# Patient Record
Sex: FEMALE | URBAN - METROPOLITAN AREA
[De-identification: names, ages, dates, MRNs, and addresses within clinical notes are randomized per-mention and may not be internally consistent; named-entity substitution may affect disease eponyms.]

---

## 2022-08-24 ENCOUNTER — APPOINTMENT (OUTPATIENT)
Dept: URBAN - METROPOLITAN AREA SURGERY 12 | Age: 42
Setting detail: DERMATOLOGY
End: 2022-08-24

## 2022-08-24 DIAGNOSIS — Z41.9 ENCOUNTER FOR PROCEDURE FOR PURPOSES OTHER THAN REMEDYING HEALTH STATE, UNSPECIFIED: ICD-10-CM

## 2022-08-24 PROCEDURE — OTHER LASER HAIR REMOVAL: OTHER

## 2022-08-24 NOTE — PROCEDURE: LASER HAIR REMOVAL
External Cooling Fan Speed: 0
Post-Procedure Care: Immediate endpoint: perifollicular erythema and edema. Sunscreen applied. Post care reviewed with patient.
Fluence (Will Not Render If 0): 22
Fluence (Will Not Render If 0): 20
Detail Level: Zone
Cooling: DCD 40/30
Render Post-Care In The Note: Yes
Tolerated Procedure (Optional): Tolerated Well
Laser Type: Alexandrite 755nm
Pulse Duration (Include Units): 3
Eye Shield Text: Given the treatment area eye shields were inserted prior to treatment.
Cooling: DCD setting
Total Pulses: 59
Spot Size: 15 mm
Shaving (Optional): The patient shaved at home
Consent: Verbal consent obtained, risks reviewed including but not limited to crusting, scabbing, blistering, scarring, darker or lighter pigmentary change, paradoxical hair regrowth, incomplete removal of hair and infection.
Treatment Number: 1
Total Pulses: 68
Pre-Procedure: Prior to proceeding the treatment areas were cleaned and all present put on their eye protection.
Were Eye Shields Employed?: No
Post-Care Instructions: I reviewed with the patient in detail post-care instructions. Patient should avoid sun for a minimum of 2 weeks before and after treatment.
Fluence (Will Not Render If 0): 12
Price (Use Numbers Only, No Special Characters Or $): 75.00

## 2023-01-03 ENCOUNTER — APPOINTMENT (OUTPATIENT)
Dept: URBAN - METROPOLITAN AREA SURGERY 12 | Age: 43
Setting detail: DERMATOLOGY
End: 2023-01-03

## 2023-01-03 DIAGNOSIS — Z41.9 ENCOUNTER FOR PROCEDURE FOR PURPOSES OTHER THAN REMEDYING HEALTH STATE, UNSPECIFIED: ICD-10-CM

## 2023-01-03 PROCEDURE — OTHER LASER HAIR REMOVAL: OTHER

## 2023-01-03 NOTE — PROCEDURE: LASER HAIR REMOVAL
Consent: Verbal consent obtained, risks reviewed including but not limited to crusting, scabbing, blistering, scarring, darker or lighter pigmentary change, paradoxical hair regrowth, incomplete removal of hair and infection.
Pulse Duration (Include Units): 3
Shaving (Optional): The patient shaved at home
Laser Type: Alexandrite 755nm
Number Of Prepaid Treatments (Will Not Render If 0): 0
Eye Shield Text: Given the treatment area eye shields were inserted prior to treatment.
Cooling: DCD setting
Spot Size: 15 mm
Total Pulses: 124
Post-Procedure Care: Immediate endpoint: perifollicular erythema and edema. Sunscreen applied. Post care reviewed with patient.
Spot Size: 18 mm
Total Pulses: 1434
Cooling: DCD 40/30
Fluence (Will Not Render If 0): 20
Render Post-Care In The Note: Yes
Pre-Procedure: Prior to proceeding the treatment areas were cleaned and all present put on their eye protection.
Treatment Number: 2
Price (Use Numbers Only, No Special Characters Or $): 75.00
Fluence (Will Not Render If 0): 14
Post-Care Instructions: I reviewed with the patient in detail post-care instructions. Patient should avoid sun for a minimum of 2 weeks before and after treatment.
Were Eye Shields Employed?: No
Detail Level: Zone
Tolerated Procedure (Optional): Tolerated Well

## 2023-11-15 ENCOUNTER — APPOINTMENT (OUTPATIENT)
Dept: URBAN - METROPOLITAN AREA SURGERY 12 | Age: 43
Setting detail: DERMATOLOGY
End: 2023-11-15

## 2023-11-15 DIAGNOSIS — Z41.9 ENCOUNTER FOR PROCEDURE FOR PURPOSES OTHER THAN REMEDYING HEALTH STATE, UNSPECIFIED: ICD-10-CM

## 2023-11-15 PROCEDURE — OTHER LASER HAIR REMOVAL: OTHER

## 2023-11-15 NOTE — PROCEDURE: LASER HAIR REMOVAL
Consent: Verbal consent obtained, risks reviewed including but not limited to crusting, scabbing, blistering, scarring, darker or lighter pigmentary change, paradoxical hair regrowth, incomplete removal of hair and infection.
Pulse Duration (Include Units): 3
Shaving (Optional): The patient shaved at home
Laser Type: Alexandrite 755nm
Number Of Prepaid Treatments (Will Not Render If 0): 0
Eye Shield Text: Given the treatment area eye shields were inserted prior to treatment.
Cooling: DCD setting
Spot Size: 18 mm
Total Pulses: 97
Post-Procedure Care: Immediate endpoint: perifollicular erythema and edema. Sunscreen applied. Post care reviewed with patient.
Total Pulses: 1434
Cooling: DCD 40/30
Fluence (Will Not Render If 0): 20
Render Post-Care In The Note: Yes
Pre-Procedure: Prior to proceeding the treatment areas were cleaned and all present put on their eye protection.
Price (Use Numbers Only, No Special Characters Or $): 75.00
Fluence (Will Not Render If 0): 10
Post-Care Instructions: I reviewed with the patient in detail post-care instructions. Patient should avoid sun for a minimum of 2 weeks before and after treatment.
Fluence (Will Not Render If 0): 14
Were Eye Shields Employed?: No
Detail Level: Zone
Tolerated Procedure (Optional): Tolerated Well

## 2024-01-24 ENCOUNTER — APPOINTMENT (OUTPATIENT)
Dept: URBAN - METROPOLITAN AREA SURGERY 12 | Age: 44
Setting detail: DERMATOLOGY
End: 2024-01-24

## 2024-01-24 DIAGNOSIS — Z41.9 ENCOUNTER FOR PROCEDURE FOR PURPOSES OTHER THAN REMEDYING HEALTH STATE, UNSPECIFIED: ICD-10-CM

## 2024-01-24 PROCEDURE — OTHER LASER HAIR REMOVAL: OTHER

## 2024-01-24 NOTE — PROCEDURE: LASER HAIR REMOVAL
Consent: Verbal consent obtained, risks reviewed including but not limited to crusting, scabbing, blistering, scarring, darker or lighter pigmentary change, paradoxical hair regrowth, incomplete removal of hair and infection.
Pulse Duration (Include Units): 3
Shaving (Optional): The patient shaved at home
Laser Type: Alexandrite 755nm
Number Of Prepaid Treatments (Will Not Render If 0): 0
Eye Shield Text: Given the treatment area eye shields were inserted prior to treatment.
Cooling: DCD setting
Spot Size: 18 mm
Total Pulses: 74
Post-Procedure Care: Immediate endpoint: perifollicular erythema and edema. Sunscreen applied. Post care reviewed with patient.
Total Pulses: 1434
Cooling: DCD 40/30
Fluence (Will Not Render If 0): 20
Render Post-Care In The Note: Yes
Pre-Procedure: Prior to proceeding the treatment areas were cleaned and all present put on their eye protection.
Treatment Number: 4
Price (Use Numbers Only, No Special Characters Or $): 75.00
Fluence (Will Not Render If 0): 12
Post-Care Instructions: I reviewed with the patient in detail post-care instructions. Patient should avoid sun for a minimum of 2 weeks before and after treatment.
Fluence (Will Not Render If 0): 14
Were Eye Shields Employed?: No
Detail Level: Zone
Tolerated Procedure (Optional): Tolerated Well

## 2024-04-17 ENCOUNTER — APPOINTMENT (OUTPATIENT)
Dept: URBAN - METROPOLITAN AREA SURGERY 12 | Age: 44
Setting detail: DERMATOLOGY
End: 2024-04-17

## 2024-04-17 DIAGNOSIS — Z41.9 ENCOUNTER FOR PROCEDURE FOR PURPOSES OTHER THAN REMEDYING HEALTH STATE, UNSPECIFIED: ICD-10-CM

## 2024-04-17 PROCEDURE — OTHER LASER HAIR REMOVAL: OTHER

## 2024-04-17 NOTE — PROCEDURE: LASER HAIR REMOVAL
Consent: Verbal consent obtained, risks reviewed including but not limited to crusting, scabbing, blistering, scarring, darker or lighter pigmentary change, paradoxical hair regrowth, incomplete removal of hair and infection.
Pulse Duration (Include Units): 3
Shaving (Optional): The patient shaved at home
Laser Type: Alexandrite 755nm
Number Of Prepaid Treatments (Will Not Render If 0): 0
Eye Shield Text: Given the treatment area eye shields were inserted prior to treatment.
Cooling: DCD setting
Spot Size: 15 mm
Total Pulses: 97
Post-Procedure Care: Immediate endpoint: perifollicular erythema and edema. Sunscreen applied. Post care reviewed with patient.
Spot Size: 18 mm
Total Pulses: 1434
Cooling: DCD 40/30
Fluence (Will Not Render If 0): 20
Render Post-Care In The Note: Yes
Pre-Procedure: Prior to proceeding the treatment areas were cleaned and all present put on their eye protection.
Treatment Number: 5
Price (Use Numbers Only, No Special Characters Or $): 75.00
Fluence (Will Not Render If 0): 16
Post-Care Instructions: I reviewed with the patient in detail post-care instructions. Patient should avoid sun for a minimum of 2 weeks before and after treatment.
Fluence (Will Not Render If 0): 14
Were Eye Shields Employed?: No
Detail Level: Zone
Tolerated Procedure (Optional): Tolerated Well

## 2024-06-11 ENCOUNTER — APPOINTMENT (OUTPATIENT)
Dept: URBAN - METROPOLITAN AREA SURGERY 12 | Age: 44
Setting detail: DERMATOLOGY
End: 2024-06-11

## 2024-06-11 DIAGNOSIS — Z41.9 ENCOUNTER FOR PROCEDURE FOR PURPOSES OTHER THAN REMEDYING HEALTH STATE, UNSPECIFIED: ICD-10-CM

## 2024-06-11 PROCEDURE — OTHER LASER HAIR REMOVAL: OTHER

## 2024-06-11 NOTE — PROCEDURE: LASER HAIR REMOVAL
Consent: Verbal consent obtained, risks reviewed including but not limited to crusting, scabbing, blistering, scarring, darker or lighter pigmentary change, paradoxical hair regrowth, incomplete removal of hair and infection.
Pulse Duration (Include Units): 3
Shaving (Optional): The patient shaved at home
Laser Type: Alexandrite 755nm
Number Of Prepaid Treatments (Will Not Render If 0): 0
Eye Shield Text: Given the treatment area eye shields were inserted prior to treatment.
Cooling: DCD setting
Spot Size: 15 mm
Total Pulses: 82
Post-Procedure Care: Immediate endpoint: perifollicular erythema and edema. Sunscreen applied. Post care reviewed with patient.
Spot Size: 18 mm
Total Pulses: 1434
Cooling: DCD 40/30
Fluence (Will Not Render If 0): 20
Render Post-Care In The Note: Yes
Pre-Procedure: Prior to proceeding the treatment areas were cleaned and all present put on their eye protection.
Treatment Number: 6
Price (Use Numbers Only, No Special Characters Or $): 75.00
Fluence (Will Not Render If 0): 18
Post-Care Instructions: I reviewed with the patient in detail post-care instructions. Patient should avoid sun for a minimum of 2 weeks before and after treatment.
Fluence (Will Not Render If 0): 14
Were Eye Shields Employed?: No
Detail Level: Zone
Tolerated Procedure (Optional): Tolerated Well

## 2024-08-06 ENCOUNTER — APPOINTMENT (OUTPATIENT)
Dept: URBAN - METROPOLITAN AREA SURGERY 12 | Age: 44
Setting detail: DERMATOLOGY
End: 2024-08-06

## 2024-08-06 DIAGNOSIS — Z41.9 ENCOUNTER FOR PROCEDURE FOR PURPOSES OTHER THAN REMEDYING HEALTH STATE, UNSPECIFIED: ICD-10-CM

## 2024-08-06 PROCEDURE — OTHER LASER HAIR REMOVAL: OTHER

## 2024-08-06 NOTE — PROCEDURE: LASER HAIR REMOVAL
Consent: Verbal consent obtained, risks reviewed including but not limited to crusting, scabbing, blistering, scarring, darker or lighter pigmentary change, paradoxical hair regrowth, incomplete removal of hair and infection.
Pulse Duration (Include Units): 3
Shaving (Optional): The patient shaved at home
Laser Type: Alexandrite 755nm
Instructions (Optional): treating 15+ lesions to the (right ear and nose) due to lesions being rough, irritated, raised, scaly itchy and suspicious for premalignancy
Number Of Prepaid Treatments (Will Not Render If 0): 0
Eye Shield Text: Given the treatment area eye shields were inserted prior to treatment.
Other Procedure: ED&C, Cautery
Cooling: DCD setting
Size Of Lesion In Cm (Optional): 0
Procedure To Be Performed At Next Visit: Cryotherapy
Spot Size: 15 mm
Introduction Text (Please End With A Colon): The following are to be performed next visit: AK
Total Pulses: 151
Detail Level: Detailed
Post-Procedure Care: Immediate endpoint: perifollicular erythema and edema. Sunscreen applied. Post care reviewed with patient.
Spot Size: 18 mm
Total Pulses: 1434
Cooling: DCD 40/30
Instructions (Optional): treating 15+ lesions to the (Neck) due to lesions being rough, irritated, and itchy
Fluence (Will Not Render If 0): 20
Introduction Text (Please End With A Colon): The following are to be performed next visit: EDC
Render Post-Care In The Note: Yes
Pre-Procedure: Prior to proceeding the treatment areas were cleaned and all present put on their eye protection.
Treatment Number: 7
Price (Use Numbers Only, No Special Characters Or $): 75.00
Fluence (Will Not Render If 0): 18
Post-Care Instructions: I reviewed with the patient in detail post-care instructions. Patient should avoid sun for a minimum of 2 weeks before and after treatment.
Fluence (Will Not Render If 0): 14
Were Eye Shields Employed?: No
Detail Level: Zone
Tolerated Procedure (Optional): Tolerated Well

## 2024-10-21 ENCOUNTER — APPOINTMENT (OUTPATIENT)
Dept: URBAN - METROPOLITAN AREA SURGERY 12 | Age: 44
Setting detail: DERMATOLOGY
End: 2024-10-21

## 2024-10-21 DIAGNOSIS — Z41.9 ENCOUNTER FOR PROCEDURE FOR PURPOSES OTHER THAN REMEDYING HEALTH STATE, UNSPECIFIED: ICD-10-CM

## 2024-10-21 PROCEDURE — OTHER LASER HAIR REMOVAL: OTHER

## 2024-10-21 NOTE — PROCEDURE: LASER HAIR REMOVAL
Consent: Verbal consent obtained, risks reviewed including but not limited to crusting, scabbing, blistering, scarring, darker or lighter pigmentary change, paradoxical hair regrowth, incomplete removal of hair and infection.
Pulse Duration (Include Units): 3
Shaving (Optional): The patient shaved at home
Laser Type: Alexandrite 755nm
Number Of Prepaid Treatments (Will Not Render If 0): 0
Eye Shield Text: Given the treatment area eye shields were inserted prior to treatment.
Cooling: DCD setting
Spot Size: 15 mm
Total Pulses: 84
Post-Procedure Care: Immediate endpoint: perifollicular erythema and edema. Sunscreen applied. Post care reviewed with patient.
Spot Size: 18 mm
Total Pulses: 1434
Cooling: DCD 40/30
Fluence (Will Not Render If 0): 20
Render Post-Care In The Note: Yes
Pre-Procedure: Prior to proceeding the treatment areas were cleaned and all present put on their eye protection.
Treatment Number: 8
Price (Use Numbers Only, No Special Characters Or $): 75.00
Fluence (Will Not Render If 0): 18
Post-Care Instructions: I reviewed with the patient in detail post-care instructions. Patient should avoid sun for a minimum of 2 weeks before and after treatment.
Fluence (Will Not Render If 0): 14
Were Eye Shields Employed?: No
Detail Level: Zone
Tolerated Procedure (Optional): Tolerated Well

## 2024-12-16 ENCOUNTER — APPOINTMENT (OUTPATIENT)
Dept: URBAN - METROPOLITAN AREA SURGERY 12 | Age: 44
Setting detail: DERMATOLOGY
End: 2024-12-16

## 2024-12-16 DIAGNOSIS — Z41.9 ENCOUNTER FOR PROCEDURE FOR PURPOSES OTHER THAN REMEDYING HEALTH STATE, UNSPECIFIED: ICD-10-CM

## 2024-12-16 PROCEDURE — OTHER LASER HAIR REMOVAL: OTHER

## 2024-12-16 PROCEDURE — OTHER ADDITIONAL NOTES: OTHER

## 2024-12-16 NOTE — PROCEDURE: ADDITIONAL NOTES
Additional Notes: Discussed treating angiomas at next visit with laser or biopsy with cautery. 
Render Risk Assessment In Note?: no
Detail Level: Simple

## 2024-12-16 NOTE — PROCEDURE: LASER HAIR REMOVAL
Consent: Written consent obtained, risks reviewed including but not limited to crusting, scabbing, blistering, scarring, darker or lighter pigmentary change, paradoxical hair regrowth, incomplete removal of hair and infection.
Pulse Duration (Include Units): 3
Shaving (Optional): The patient shaved at home
Laser Type: Nd:Yag 1064nm
Number Of Prepaid Treatments (Will Not Render If 0): 0
Eye Shield Text: Given the treatment area eye shields were inserted prior to treatment.
Cooling: DCD setting
Spot Size: 15 mm
Total Pulses: 95
Post-Procedure Care: Immediate endpoint: perifollicular erythema and edema. Sunscreen applied. Post care reviewed with patient.
Spot Size: 18 mm
Total Pulses: 1434
Cooling: DCD 40/30
Fluence (Will Not Render If 0): 20
Render Post-Care In The Note: No
Pre-Procedure: Prior to proceeding the treatment areas were cleaned and all present put on their eye protection.
Treatment Number: 9
Price (Use Numbers Only, No Special Characters Or $): 75.00
Fluence (Will Not Render If 0): 22
Fluence (Will Not Render If 0): 14
Detail Level: Zone
Tolerated Procedure (Optional): Tolerated Well
Pulse Duration (Include Units): 10

## 2025-02-03 ENCOUNTER — APPOINTMENT (OUTPATIENT)
Dept: URBAN - METROPOLITAN AREA SURGERY 12 | Age: 45
Setting detail: DERMATOLOGY
End: 2025-02-04

## 2025-02-03 DIAGNOSIS — Z41.9 ENCOUNTER FOR PROCEDURE FOR PURPOSES OTHER THAN REMEDYING HEALTH STATE, UNSPECIFIED: ICD-10-CM

## 2025-02-03 PROCEDURE — OTHER LASER HAIR REMOVAL: OTHER

## 2025-02-03 NOTE — PROCEDURE: LASER HAIR REMOVAL
Consent: Written consent obtained, risks reviewed including but not limited to crusting, scabbing, blistering, scarring, darker or lighter pigmentary change, paradoxical hair regrowth, incomplete removal of hair and infection.
Pulse Duration (Include Units): 3
Shaving (Optional): The patient shaved at home
Laser Type: Nd:Yag 1064nm
Number Of Prepaid Treatments (Will Not Render If 0): 0
Eye Shield Text: Given the treatment area eye shields were inserted prior to treatment.
Cooling: DCD setting
Spot Size: 15 mm
Total Pulses: 166
Post-Procedure Care: Immediate endpoint: perifollicular erythema and edema. Sunscreen applied. Post care reviewed with patient.
Spot Size: 18 mm
Total Pulses: 1434
Cooling: DCD 40/30
Fluence (Will Not Render If 0): 20
Render Post-Care In The Note: No
Pre-Procedure: Prior to proceeding the treatment areas were cleaned and all present put on their eye protection.
Treatment Number: 10
Price (Use Numbers Only, No Special Characters Or $): 75.00
Fluence (Will Not Render If 0): 24
Fluence (Will Not Render If 0): 14
Detail Level: Zone
Tolerated Procedure (Optional): Tolerated Well

## 2025-02-13 ENCOUNTER — APPOINTMENT (OUTPATIENT)
Dept: URBAN - METROPOLITAN AREA SURGERY 12 | Age: 45
Setting detail: DERMATOLOGY
End: 2025-02-13

## 2025-02-13 DIAGNOSIS — Z41.9 ENCOUNTER FOR PROCEDURE FOR PURPOSES OTHER THAN REMEDYING HEALTH STATE, UNSPECIFIED: ICD-10-CM

## 2025-02-13 PROCEDURE — OTHER LASER HAIR REMOVAL: OTHER

## 2025-02-13 NOTE — PROCEDURE: LASER HAIR REMOVAL
Consent: Verbal consent obtained, risks reviewed including but not limited to crusting, scabbing, blistering, scarring, darker or lighter pigmentary change, paradoxical hair regrowth, incomplete removal of hair and infection.
Pulse Duration (Include Units): 3
Shaving (Optional): The patient shaved at home
Laser Type: Alexandrite 755nm
Number Of Prepaid Treatments (Will Not Render If 0): 0
Eye Shield Text: Given the treatment area eye shields were inserted prior to treatment.
Cooling: DCD setting
Spot Size: 15 mm
Total Pulses: 170
Post-Procedure Care: Immediate endpoint: perifollicular erythema and edema. Sunscreen applied. Post care reviewed with patient.
Spot Size: 18 mm
Total Pulses: 1434
Cooling: DCD 40/30
Fluence (Will Not Render If 0): 20
Render Post-Care In The Note: Yes
Pre-Procedure: Prior to proceeding the treatment areas were cleaned and all present put on their eye protection.
Treatment Number: 10
Price (Use Numbers Only, No Special Characters Or $): 75.00
Post-Care Instructions: I reviewed with the patient in detail post-care instructions. Patient should avoid sun for a minimum of 2 weeks before and after treatment.
Fluence (Will Not Render If 0): 14
Were Eye Shields Employed?: No
Detail Level: Zone
Tolerated Procedure (Optional): Tolerated Well